# Patient Record
Sex: FEMALE | Race: WHITE | Employment: OTHER | URBAN - METROPOLITAN AREA
[De-identification: names, ages, dates, MRNs, and addresses within clinical notes are randomized per-mention and may not be internally consistent; named-entity substitution may affect disease eponyms.]

---

## 2024-05-09 ENCOUNTER — OFFICE VISIT (OUTPATIENT)
Age: 69
End: 2024-05-09
Payer: MEDICARE

## 2024-05-09 VITALS
SYSTOLIC BLOOD PRESSURE: 110 MMHG | HEART RATE: 65 BPM | HEIGHT: 64 IN | WEIGHT: 124 LBS | BODY MASS INDEX: 21.17 KG/M2 | DIASTOLIC BLOOD PRESSURE: 68 MMHG

## 2024-05-09 VITALS
BODY MASS INDEX: 21.82 KG/M2 | SYSTOLIC BLOOD PRESSURE: 126 MMHG | WEIGHT: 127.8 LBS | TEMPERATURE: 98.2 F | OXYGEN SATURATION: 97 % | DIASTOLIC BLOOD PRESSURE: 68 MMHG | HEIGHT: 64 IN | RESPIRATION RATE: 16 BRPM | HEART RATE: 68 BPM

## 2024-05-09 DIAGNOSIS — Z00.00 MEDICARE ANNUAL WELLNESS VISIT, SUBSEQUENT: Primary | ICD-10-CM

## 2024-05-09 DIAGNOSIS — Z12.31 SCREENING MAMMOGRAM FOR BREAST CANCER: ICD-10-CM

## 2024-05-09 DIAGNOSIS — E55.9 VITAMIN D DEFICIENCY: ICD-10-CM

## 2024-05-09 DIAGNOSIS — E78.2 MIXED HYPERLIPIDEMIA: ICD-10-CM

## 2024-05-09 DIAGNOSIS — Z82.49 FAMILY HISTORY OF ABDOMINAL AORTIC ANEURYSM (AAA): ICD-10-CM

## 2024-05-09 PROBLEM — H35.413 LATTICE DEGENERATION OF PERIPHERAL RETINA, BILATERAL: Status: ACTIVE | Noted: 2021-09-13

## 2024-05-09 PROBLEM — H43.393 VITREOUS SYNERESIS OF BOTH EYES: Status: ACTIVE | Noted: 2021-09-13

## 2024-05-09 PROBLEM — H25.813 COMBINED FORMS OF AGE-RELATED CATARACT OF BOTH EYES: Status: ACTIVE | Noted: 2021-09-13

## 2024-05-09 PROBLEM — H52.4 PRESBYOPIA: Status: ACTIVE | Noted: 2021-09-13

## 2024-05-09 PROBLEM — H18.529 MAP-DOT-FINGERPRINT CORNEAL DYSTROPHY: Status: ACTIVE | Noted: 2021-09-13

## 2024-05-09 PROCEDURE — 3017F COLORECTAL CA SCREEN DOC REV: CPT | Performed by: FAMILY MEDICINE

## 2024-05-09 PROCEDURE — 1123F ACP DISCUSS/DSCN MKR DOCD: CPT | Performed by: FAMILY MEDICINE

## 2024-05-09 PROCEDURE — G0439 PPPS, SUBSEQ VISIT: HCPCS | Performed by: FAMILY MEDICINE

## 2024-05-09 RX ORDER — UBIDECARENONE 75 MG
50 CAPSULE ORAL DAILY
COMMUNITY

## 2024-05-09 RX ORDER — BIOTIN 10000 MCG
1 CAPSULE ORAL DAILY
COMMUNITY

## 2024-05-09 SDOH — ECONOMIC STABILITY: FOOD INSECURITY: WITHIN THE PAST 12 MONTHS, YOU WORRIED THAT YOUR FOOD WOULD RUN OUT BEFORE YOU GOT MONEY TO BUY MORE.: NEVER TRUE

## 2024-05-09 SDOH — ECONOMIC STABILITY: INCOME INSECURITY: HOW HARD IS IT FOR YOU TO PAY FOR THE VERY BASICS LIKE FOOD, HOUSING, MEDICAL CARE, AND HEATING?: NOT HARD AT ALL

## 2024-05-09 SDOH — ECONOMIC STABILITY: FOOD INSECURITY: WITHIN THE PAST 12 MONTHS, THE FOOD YOU BOUGHT JUST DIDN'T LAST AND YOU DIDN'T HAVE MONEY TO GET MORE.: NEVER TRUE

## 2024-05-09 SDOH — ECONOMIC STABILITY: HOUSING INSECURITY
IN THE LAST 12 MONTHS, WAS THERE A TIME WHEN YOU DID NOT HAVE A STEADY PLACE TO SLEEP OR SLEPT IN A SHELTER (INCLUDING NOW)?: NO

## 2024-05-09 ASSESSMENT — PATIENT HEALTH QUESTIONNAIRE - PHQ9
2. FEELING DOWN, DEPRESSED OR HOPELESS: NOT AT ALL
SUM OF ALL RESPONSES TO PHQ QUESTIONS 1-9: 0
1. LITTLE INTEREST OR PLEASURE IN DOING THINGS: NOT AT ALL
SUM OF ALL RESPONSES TO PHQ QUESTIONS 1-9: 0
SUM OF ALL RESPONSES TO PHQ QUESTIONS 1-9: 0
SUM OF ALL RESPONSES TO PHQ9 QUESTIONS 1 & 2: 0
SUM OF ALL RESPONSES TO PHQ QUESTIONS 1-9: 0

## 2024-05-09 ASSESSMENT — LIFESTYLE VARIABLES
HOW OFTEN DO YOU HAVE A DRINK CONTAINING ALCOHOL: MONTHLY OR LESS
HOW MANY STANDARD DRINKS CONTAINING ALCOHOL DO YOU HAVE ON A TYPICAL DAY: 1 OR 2

## 2024-05-09 NOTE — PROGRESS NOTES
[facility]  [facility]  [address]  [place] [address]     Date of Visit:  2024  Patient Name: Sammie Acosta   Patient :  1955     CHIEF COMPLAINT/HPI:     Chief Complaint   Patient presents with    Medicare AWV     Patient is here for AMW visit, no labs done for today         HPI      Sammie Acosta, 68 y.o. presents today for Medicare Annual Wellness Visit.     She states her knee pain is much better but is triggered if she kicks something to the side. She previously had some right shoulder pain which has improved. She has a history of bunions which ache at night if she's on her feet a lot throughout the day. She avoids wearing high heels and is careful with what footwear she uses. She denies headaches, dizziness, syncope, chest pain, shortness of breath, nausea, vomiting, diarrhea, constipation, blood in her stool, edema, vision changes, fever, or chills. She reports itchy, dry patches of skin on her back. No cardiovascular complications when walking. She is not taking any prescription medications but uses OTC supplements including Biotin, Iron, vitamin B-12 and vitamin D3.     She follows with the eye doctor routinely and is to have right cataract surgery in the near future. She follows with Cora Coon for gyn who will update her DEXA.     Mother was diagnosed with cancer in  and has history of IBS and AAA.       She admits  was a difficult year for her as her mother was diagnosed with cancer, her daughter fell off a rock climbing wall in October and had moved in with her, two of her sisters were in bad car accidents, and her brother-in-law committed suicide. Her family has been very supportive and she has friends who she walks with regularly that are very supportive of her as well.     Copied from 2023 Office Note:    Sammie Acosta, a 67-year-old female, presents today for her annual exam.         She's been complaining of some 
Medicine)  Jennifer Chong DO as PCP - Empaneled Provider     Reviewed and updated this visit:  Tobacco  Allergies  Meds  Med Hx  Surg Hx  Soc Hx  Fam Hx       Full code

## 2024-05-31 ENCOUNTER — TRANSCRIBE ORDERS (OUTPATIENT)
Dept: ADMINISTRATIVE | Age: 69
End: 2024-05-31

## 2024-05-31 DIAGNOSIS — Z82.49: Primary | ICD-10-CM

## 2024-05-31 DIAGNOSIS — R10.9 ABDOMINAL PAIN, UNSPECIFIED ABDOMINAL LOCATION: ICD-10-CM

## 2024-06-03 ENCOUNTER — TELEPHONE (OUTPATIENT)
Age: 69
End: 2024-06-03

## 2024-06-03 DIAGNOSIS — R10.9 ABDOMINAL PAIN, UNSPECIFIED ABDOMINAL LOCATION: ICD-10-CM

## 2024-06-03 DIAGNOSIS — Z82.49 FAMILY HISTORY OF ABDOMINAL AORTIC ANEURYSM (AAA): Primary | ICD-10-CM

## 2024-06-03 NOTE — TELEPHONE ENCOUNTER
----- Message from Ximena Pak sent at 6/3/2024  9:35 AM EDT -----  Good morning, this should be ordered with cupid code [id number]. Please reorder.     Thanks.

## 2024-06-09 ENCOUNTER — TELEPHONE (OUTPATIENT)
Age: 69
End: 2024-06-09

## 2024-06-09 DIAGNOSIS — I70.90 ATHEROSCLEROSIS: Primary | ICD-10-CM

## 2024-06-09 NOTE — TELEPHONE ENCOUNTER
Please inform pt her US of aorta isn't going to be covered with family history.  If she has any significant abdominal or back pain let me know and I can try to get it covered then

## 2024-06-09 NOTE — TELEPHONE ENCOUNTER
----- Message from Nella Cruz sent at 6/7/2024  1:10 PM EDT -----  DX CODE DOES NOT PASS MEDICAL NECESSITY. PLEASE RE EVALUATE. THANK YOU.

## 2024-06-14 NOTE — PROGRESS NOTES
give the provider a full picture of the patient's urogynecologic issues and make treatment less than optimal despite the practitioner's best effort to obtain information.     3. Educational handouts were discussed and given when applicable.    4. Testing recommendations were given as indicated.    Preventative care: Disclaimer: - This note is created with the assistance of a speech recognition program.  While intending to generate a timely document that accurately reflects the content of the visit, there is no guarantee every grammatical, syntax, or spelling error has been or will be identified or corrected.  Additionally, system limitations of the EMR beyond the control of the practitioner may cause unintentional errors or omissions not identified or corrected at the time of record finalization and signature.    Multiple records reviewed. All questions were addressed to the patient's satisfaction.  38 minutes total spent      Electronically signed by MAY Motley CNP on 6/17/2024 at 10:21 AM

## 2024-06-17 ENCOUNTER — TELEPHONE (OUTPATIENT)
Age: 69
End: 2024-06-17

## 2024-06-17 ENCOUNTER — HOSPITAL ENCOUNTER (OUTPATIENT)
Age: 69
Setting detail: SPECIMEN
Discharge: HOME | End: 2024-06-17

## 2024-06-17 ENCOUNTER — OFFICE VISIT (OUTPATIENT)
Age: 69
End: 2024-06-17
Payer: MEDICARE

## 2024-06-17 VITALS
SYSTOLIC BLOOD PRESSURE: 126 MMHG | DIASTOLIC BLOOD PRESSURE: 78 MMHG | BODY MASS INDEX: 21.8 KG/M2 | HEART RATE: 74 BPM | WEIGHT: 125 LBS

## 2024-06-17 DIAGNOSIS — M85.852 OSTEOPENIA OF BOTH HIPS: ICD-10-CM

## 2024-06-17 DIAGNOSIS — M85.851 OSTEOPENIA OF BOTH HIPS: ICD-10-CM

## 2024-06-17 DIAGNOSIS — N95.2 ATROPHIC VULVOVAGINITIS: ICD-10-CM

## 2024-06-17 DIAGNOSIS — Z12.31 ENCOUNTER FOR SCREENING MAMMOGRAM FOR MALIGNANT NEOPLASM OF BREAST: ICD-10-CM

## 2024-06-17 DIAGNOSIS — Z01.419 ENCOUNTER FOR ANNUAL ROUTINE GYNECOLOGICAL EXAMINATION: ICD-10-CM

## 2024-06-17 LAB
BILIRUBIN, POC: NORMAL
BLOOD URINE, POC: NORMAL
CLARITY, POC: CLEAR
COLOR, POC: YELLOW
GLUCOSE URINE, POC: NORMAL
KETONES, POC: NORMAL
LEUKOCYTE EST, POC: NORMAL
NITRITE, POC: NORMAL
PH, POC: 7
PROTEIN, POC: NORMAL
SPECIFIC GRAVITY, POC: 1
UROBILINOGEN, POC: NORMAL

## 2024-06-17 PROCEDURE — 3017F COLORECTAL CA SCREEN DOC REV: CPT | Performed by: NURSE PRACTITIONER

## 2024-06-17 PROCEDURE — G0101 CA SCREEN;PELVIC/BREAST EXAM: HCPCS | Performed by: NURSE PRACTITIONER

## 2024-06-17 PROCEDURE — 99213 OFFICE O/P EST LOW 20 MIN: CPT | Performed by: NURSE PRACTITIONER

## 2024-06-17 PROCEDURE — G8427 DOCREV CUR MEDS BY ELIG CLIN: HCPCS | Performed by: NURSE PRACTITIONER

## 2024-06-17 PROCEDURE — G8420 CALC BMI NORM PARAMETERS: HCPCS | Performed by: NURSE PRACTITIONER

## 2024-06-17 PROCEDURE — G8400 PT W/DXA NO RESULTS DOC: HCPCS | Performed by: NURSE PRACTITIONER

## 2024-06-17 PROCEDURE — 81003 URINALYSIS AUTO W/O SCOPE: CPT | Performed by: NURSE PRACTITIONER

## 2024-06-17 PROCEDURE — 1036F TOBACCO NON-USER: CPT | Performed by: NURSE PRACTITIONER

## 2024-06-17 PROCEDURE — 1090F PRES/ABSN URINE INCON ASSESS: CPT | Performed by: NURSE PRACTITIONER

## 2024-06-17 PROCEDURE — 1123F ACP DISCUSS/DSCN MKR DOCD: CPT | Performed by: NURSE PRACTITIONER

## 2024-06-17 NOTE — TELEPHONE ENCOUNTER
[facility] scheduling called and states that the DX code does not work for this order Vascular Duplex Abdominal Aorta and is requesting for a new Dx code advised that Dr is on vacation.

## 2024-06-17 NOTE — TELEPHONE ENCOUNTER
Spoke with central scheduling they said they can't tell us what Dx code to use. She recommended calling back tomorrow and asking to speak w/a radiologist to see if they would know.   Call radiology tomorrow 6/17/24

## 2024-06-18 NOTE — TELEPHONE ENCOUNTER
Called radiology then transferred me to US dept. They said they are not sure what Dx code to use. The ones they see that get covered are codes pertaining to the patient already having this issue not family history related.     Called patient and advised her we could not find a code and it might not get covered since her reason is family Hx she said no big deal she can hold off on the test for a year. Sending to you as TONG

## 2024-06-24 LAB — CYTOLOGY REPORT: NORMAL

## 2024-07-16 ENCOUNTER — HOSPITAL ENCOUNTER (OUTPATIENT)
Dept: MAMMOGRAPHY | Age: 69
Discharge: HOME | End: 2024-07-18
Attending: FAMILY MEDICINE
Payer: MEDICARE

## 2024-07-16 VITALS — HEIGHT: 63 IN | BODY MASS INDEX: 22.15 KG/M2 | WEIGHT: 125 LBS

## 2024-07-16 DIAGNOSIS — Z12.31 SCREENING MAMMOGRAM FOR BREAST CANCER: ICD-10-CM

## 2024-07-16 PROCEDURE — 77063 BREAST TOMOSYNTHESIS BI: CPT

## 2024-09-05 DIAGNOSIS — M85.852 OSTEOPENIA OF BOTH HIPS: ICD-10-CM

## 2024-09-05 DIAGNOSIS — M85.851 OSTEOPENIA OF BOTH HIPS: ICD-10-CM

## 2024-11-13 ENCOUNTER — OFFICE VISIT (OUTPATIENT)
Age: 69
End: 2024-11-13

## 2024-11-13 VITALS
WEIGHT: 126.6 LBS | SYSTOLIC BLOOD PRESSURE: 120 MMHG | BODY MASS INDEX: 22.43 KG/M2 | DIASTOLIC BLOOD PRESSURE: 82 MMHG | TEMPERATURE: 97.7 F | HEIGHT: 63 IN | OXYGEN SATURATION: 97 % | HEART RATE: 73 BPM

## 2024-11-13 DIAGNOSIS — S86.911S KNEE STRAIN, RIGHT, SEQUELA: ICD-10-CM

## 2024-11-13 DIAGNOSIS — M25.561 PAIN IN BOTH KNEES, UNSPECIFIED CHRONICITY: Primary | ICD-10-CM

## 2024-11-13 DIAGNOSIS — M25.562 PAIN IN BOTH KNEES, UNSPECIFIED CHRONICITY: Primary | ICD-10-CM

## 2024-11-13 NOTE — PATIENT INSTRUCTIONS
Sammie    Thank you for choosing [facility].  We know you have options when it comes to your healthcare; we appreciate that you chose us. Our goal is to provide exceptional  service and world class care to every patient.  You will be receiving a survey via email or text message asking for your feedback.  Please take a few minutes to share your thoughts about your recent visit. Your comments help us understand what we do well and ways we can improve.  Thank you in advance for your valuable feedback.      Dr. MALLIKA Schroeder

## 2024-11-13 NOTE — PROGRESS NOTES
Difficulty of Paying Living Expenses: Not hard at all   Food Insecurity: No Food Insecurity (5/9/2024)    Hunger Vital Sign     Worried About Running Out of Food in the Last Year: Never true     Ran Out of Food in the Last Year: Never true   Transportation Needs: Unknown (5/9/2024)    PRAPARE - Transportation     Lack of Transportation (Non-Medical): No   Physical Activity: Sufficiently Active (5/9/2024)    Exercise Vital Sign     Days of Exercise per Week: 3 days     Minutes of Exercise per Session: 60 min   Housing Stability: Unknown (5/9/2024)    Housing Stability Vital Sign     Unstable Housing in the Last Year: No         Physical Exam:    /82   Pulse 73   Temp 97.7 °F (36.5 °C)   Ht 1.6 m (5' 3\")   Wt 57.4 kg (126 lb 9.6 oz)   LMP  (LMP Unknown)   SpO2 97%   BMI 22.43 kg/m²     GENERAL APPEARANCE:  in no acute distress, well developed, well nourished.   LUNGS:  clear to auscultation bilaterally.   CARDIO:  S1, S2 normal, no murmurs, rubs, gallops.   MUSCULOSKELETAL: good range of motion left knee, no crepitus with flexion or extension bilaterally, right knee good range of motion with tenderness along medial joint line no pain over patellar tendon, ligaments intact. No locking or catching.  EXTREMITIES:  no clubbing, cyanosis, or edema.   NEUROLOGIC:  alert, oriented to time, place, & person.      Assessment:    The primary encounter diagnosis was Pain in both knees, unspecified chronicity. A diagnosis of Knee strain, right, sequela was also pertinent to this visit.     1. Pain in both knees, unspecified chronicity  -     XR KNEE BILATERAL STANDARD (3 VIEWS); [facility]  2. Knee strain, right, sequela  -     XR KNEE BILATERAL STANDARD (3 VIEWS); [facility]      Plan:    1. Others    Notes: Recommended to rest, ice, compress, and elevate the area. Take NSAIDs for pain as needed.        Disposition and Communications:

## 2025-05-12 ENCOUNTER — OFFICE VISIT (OUTPATIENT)
Age: 70
End: 2025-05-12

## 2025-05-12 VITALS
WEIGHT: 124 LBS | OXYGEN SATURATION: 97 % | HEART RATE: 63 BPM | SYSTOLIC BLOOD PRESSURE: 90 MMHG | BODY MASS INDEX: 21.97 KG/M2 | RESPIRATION RATE: 12 BRPM | DIASTOLIC BLOOD PRESSURE: 62 MMHG | HEIGHT: 63 IN

## 2025-05-12 DIAGNOSIS — Z12.31 ENCOUNTER FOR SCREENING MAMMOGRAM FOR MALIGNANT NEOPLASM OF BREAST: ICD-10-CM

## 2025-05-12 DIAGNOSIS — E78.2 MIXED HYPERLIPIDEMIA: ICD-10-CM

## 2025-05-12 DIAGNOSIS — Z00.00 MEDICARE ANNUAL WELLNESS VISIT, SUBSEQUENT: Primary | ICD-10-CM

## 2025-05-12 DIAGNOSIS — E55.9 VITAMIN D DEFICIENCY: ICD-10-CM

## 2025-05-12 RX ORDER — PREDNISOLONE ACETATE 10 MG/ML
SUSPENSION/ DROPS OPHTHALMIC
COMMUNITY
Start: 2025-03-08

## 2025-05-12 SDOH — ECONOMIC STABILITY: FOOD INSECURITY: WITHIN THE PAST 12 MONTHS, THE FOOD YOU BOUGHT JUST DIDN'T LAST AND YOU DIDN'T HAVE MONEY TO GET MORE.: NEVER TRUE

## 2025-05-12 SDOH — ECONOMIC STABILITY: FOOD INSECURITY: WITHIN THE PAST 12 MONTHS, YOU WORRIED THAT YOUR FOOD WOULD RUN OUT BEFORE YOU GOT MONEY TO BUY MORE.: NEVER TRUE

## 2025-05-12 ASSESSMENT — PATIENT HEALTH QUESTIONNAIRE - PHQ9
SUM OF ALL RESPONSES TO PHQ QUESTIONS 1-9: 0
1. LITTLE INTEREST OR PLEASURE IN DOING THINGS: NOT AT ALL
SUM OF ALL RESPONSES TO PHQ QUESTIONS 1-9: 0
2. FEELING DOWN, DEPRESSED OR HOPELESS: NOT AT ALL

## 2025-05-12 ASSESSMENT — LIFESTYLE VARIABLES
HOW OFTEN DO YOU HAVE A DRINK CONTAINING ALCOHOL: 2-4 TIMES A MONTH
HOW MANY STANDARD DRINKS CONTAINING ALCOHOL DO YOU HAVE ON A TYPICAL DAY: 1 OR 2

## 2025-05-12 NOTE — PROGRESS NOTES
[facility]  [facility]  [address]  [place] [address]  Dept: [phone]     Date of Visit:  2025  Patient Name: Sammie Acosta   Patient :  1955     CHIEF COMPLAINT/HPI:     Chief Complaint   Patient presents with    Medicare AWV     Subsequent medicare wellness         HPI      Sammie Acosta, 69 y.o. presents today for a Medicare Annual Wellness Visit and a follow up of hyperlipidemia and vitamin D deficiency. She has been well. She is still very busy and active. She notes her daughters are moving. Her one daughter is also pregnant. She recently returned from [place] [facility] [place]\"" and she walked a lot on this trip. She completed physical therapy for her knees with great benefit.     She had her cataracts removed. She has not seen dermatology recently. She notes sun spots/age spots on her face.    She sees Constanza Coon CNP, for gynecological care, she completes her pap smears.     She denies headaches, dizziness, syncope, chest pain, shortness of breath, nausea, vomiting, diarrhea, constipation, blood in her stool, dark black stools, edema, claudication, skin changes, vision changes, fever, or chills.      REVIEW OF SYSTEM      Review of Systems:   Constitutional:  Negative for chills, fatigue, fever and unexpected weight change.   Eyes:  Negative for visual disturbance.   Respiratory:  Negative for cough, chest tightness, shortness of breath and wheezing.    Cardiovascular:  Negative for chest pain, palpitations and leg swelling.   Gastrointestinal:  Negative for abdominal distention, abdominal pain, blood in stool, constipation, diarrhea, nausea and vomiting.   Genitourinary:  Negative for dysuria, hematuria and urgency.   Musculoskeletal:  Negative for back pain, neck pain and neck stiffness.   Skin:  Negative for rash and wound.   Neurological:  Negative for syncope, weakness, light-headedness and headaches.   Hematological:  Negative for

## 2025-05-12 NOTE — PROGRESS NOTES
Medicare Annual Wellness Visit    Sammie Acosta is here for Medicare AWV (Subsequent medicare wellness )    Assessment & Plan   Medicare annual wellness visit, subsequent  Comments:  full code, living will and poa present  Mixed hyperlipidemia  -     CBC with Auto Differential; Future  -     Comprehensive Metabolic Panel; Future  -     Lipid Panel; Future  -     TSH; Future  -     T4, Free; Future  Vitamin D deficiency  -     Vitamin D 25 Hydroxy; Future  Encounter for screening mammogram for malignant neoplasm of breast  -     SUSI DIGITAL SCREEN W OR WO CAD BILATERAL; Future       Return in about 1 year (around 5/12/2026).     Subjective       Patient's complete Health Risk Assessment and screening values have been reviewed and are found in Flowsheets. The following problems were reviewed today and where indicated follow up appointments were made and/or referrals ordered.    No Positive Risk Factors identified today.                                    Objective   Vitals:    05/12/25 0918   BP: 90/62   Pulse: 63   Resp: 12   SpO2: 97%   Weight: 56.2 kg (124 lb)   Height: 1.6 m (5' 3\")      Body mass index is 21.97 kg/m².                  Allergies   Allergen Reactions    Nubain [Nalbuphine] Nausea And Vomiting     Prior to Visit Medications    Medication Sig Taking? Authorizing Provider   Multiple Vitamins-Minerals (WOMENS 50+ MULTI VITAMIN PO) Take 1 tablet by mouth daily Yes Jadiel Santa MD   CALCIUM PO Take by mouth daily Yes Jadiel Santa MD   vitamin D3 (CHOLECALCIFEROL) 125 MCG (5000 UT) TABS tablet Take 1 tablet by mouth daily Yes Jadiel Santa MD   Biotin 10 MG CAPS Take 1 capsule by mouth daily Yes Jadiel Santa MD   vitamin B-12 (CYANOCOBALAMIN) 100 MCG tablet Take 0.5 tablets by mouth daily Yes Jadiel Santa MD   prednisoLONE acetate (PRED FORTE) 1 % ophthalmic suspension Instill 1 drop in the surgical eye 3 times a day until seen in the office. Begin after